# Patient Record
Sex: FEMALE | Race: WHITE | NOT HISPANIC OR LATINO | URBAN - NONMETROPOLITAN AREA
[De-identification: names, ages, dates, MRNs, and addresses within clinical notes are randomized per-mention and may not be internally consistent; named-entity substitution may affect disease eponyms.]

---

## 2017-01-06 ENCOUNTER — HOSPITAL ENCOUNTER (EMERGENCY)
Facility: HOSPITAL | Age: 62
Discharge: HOME | End: 2017-01-07
Attending: EMERGENCY MEDICINE | Admitting: EMERGENCY MEDICINE

## 2017-01-06 ENCOUNTER — APPOINTMENT (OUTPATIENT)
Dept: CT IMAGING | Facility: HOSPITAL | Age: 62
End: 2017-01-06

## 2017-01-06 ENCOUNTER — APPOINTMENT (OUTPATIENT)
Dept: GENERAL RADIOLOGY | Facility: HOSPITAL | Age: 62
End: 2017-01-06

## 2017-01-06 DIAGNOSIS — J01.00 ACUTE NON-RECURRENT MAXILLARY SINUSITIS: ICD-10-CM

## 2017-01-06 DIAGNOSIS — H83.03: Primary | ICD-10-CM

## 2017-01-06 PROCEDURE — 70450 CT HEAD/BRAIN W/O DYE: CPT

## 2017-01-06 PROCEDURE — 80307 DRUG TEST PRSMV CHEM ANLYZR: CPT | Performed by: NURSE PRACTITIONER

## 2017-01-06 PROCEDURE — G0477 DRUG TEST PRESUMP OPTICAL: HCPCS | Performed by: NURSE PRACTITIONER

## 2017-01-06 PROCEDURE — 87804 INFLUENZA ASSAY W/OPTIC: CPT | Performed by: NURSE PRACTITIONER

## 2017-01-06 PROCEDURE — 93005 ELECTROCARDIOGRAM TRACING: CPT | Performed by: NURSE PRACTITIONER

## 2017-01-06 PROCEDURE — 93010 ELECTROCARDIOGRAM REPORT: CPT | Performed by: INTERNAL MEDICINE

## 2017-01-06 PROCEDURE — 99284 EMERGENCY DEPT VISIT MOD MDM: CPT

## 2017-01-06 PROCEDURE — 84484 ASSAY OF TROPONIN QUANT: CPT | Performed by: NURSE PRACTITIONER

## 2017-01-06 PROCEDURE — 70450 CT HEAD/BRAIN W/O DYE: CPT | Performed by: RADIOLOGY

## 2017-01-06 PROCEDURE — 81001 URINALYSIS AUTO W/SCOPE: CPT | Performed by: NURSE PRACTITIONER

## 2017-01-06 PROCEDURE — 80048 BASIC METABOLIC PNL TOTAL CA: CPT | Performed by: NURSE PRACTITIONER

## 2017-01-06 PROCEDURE — 71010 XR CHEST 1 VW: CPT | Performed by: RADIOLOGY

## 2017-01-06 PROCEDURE — 71010 HC CHEST PA OR AP: CPT

## 2017-01-06 PROCEDURE — 85025 COMPLETE CBC W/AUTO DIFF WBC: CPT | Performed by: NURSE PRACTITIONER

## 2017-01-07 VITALS
WEIGHT: 150 LBS | BODY MASS INDEX: 25.61 KG/M2 | DIASTOLIC BLOOD PRESSURE: 79 MMHG | RESPIRATION RATE: 18 BRPM | HEIGHT: 64 IN | OXYGEN SATURATION: 100 % | TEMPERATURE: 98.8 F | HEART RATE: 70 BPM | SYSTOLIC BLOOD PRESSURE: 154 MMHG

## 2017-01-07 LAB
AMPHET+METHAMPHET UR QL: NEGATIVE
ANION GAP SERPL CALCULATED.3IONS-SCNC: 2.7 MMOL/L (ref 3.6–11.2)
BACTERIA UR QL AUTO: ABNORMAL /HPF
BARBITURATES UR QL SCN: NEGATIVE
BASOPHILS # BLD AUTO: 0.04 10*3/MM3 (ref 0–0.3)
BASOPHILS NFR BLD AUTO: 0.6 % (ref 0–2)
BENZODIAZ UR QL SCN: NEGATIVE
BILIRUB UR QL STRIP: NEGATIVE
BUN BLD-MCNC: 14 MG/DL (ref 7–21)
BUN/CREAT SERPL: 15.2 (ref 7–25)
BUPRENORPHINE+NOR UR QL SCN: NEGATIVE
CALCIUM SPEC-SCNC: 9.3 MG/DL (ref 7.7–10)
CANNABINOIDS SERPL QL: NEGATIVE
CHLORIDE SERPL-SCNC: 108 MMOL/L (ref 99–112)
CLARITY UR: CLEAR
CO2 SERPL-SCNC: 29.3 MMOL/L (ref 24.3–31.9)
COCAINE UR QL: NEGATIVE
COLOR UR: YELLOW
CREAT BLD-MCNC: 0.92 MG/DL (ref 0.43–1.29)
DEPRECATED RDW RBC AUTO: 49.6 FL (ref 37–54)
EOSINOPHIL # BLD AUTO: 0.14 10*3/MM3 (ref 0–0.7)
EOSINOPHIL NFR BLD AUTO: 2.1 % (ref 0–5)
ERYTHROCYTE [DISTWIDTH] IN BLOOD BY AUTOMATED COUNT: 12.9 % (ref 11.5–14.5)
ETHANOL BLD-MCNC: <10 MG/DL
ETHANOL UR QL: <0.01 %
FLUAV AG NPH QL: NEGATIVE
FLUBV AG NPH QL IA: NEGATIVE
GFR SERPL CREATININE-BSD FRML MDRD: 62 ML/MIN/1.73
GLUCOSE BLD-MCNC: 98 MG/DL (ref 70–110)
GLUCOSE UR STRIP-MCNC: NEGATIVE MG/DL
HCT VFR BLD AUTO: 41.9 % (ref 37–47)
HGB BLD-MCNC: 13.6 G/DL (ref 12–16)
HGB UR QL STRIP.AUTO: ABNORMAL
HYALINE CASTS UR QL AUTO: ABNORMAL /LPF
IMM GRANULOCYTES # BLD: 0.01 10*3/MM3 (ref 0–0.03)
IMM GRANULOCYTES NFR BLD: 0.2 % (ref 0–0.5)
KETONES UR QL STRIP: NEGATIVE
LEUKOCYTE ESTERASE UR QL STRIP.AUTO: NEGATIVE
LYMPHOCYTES # BLD AUTO: 2.1 10*3/MM3 (ref 1–3)
LYMPHOCYTES NFR BLD AUTO: 31.7 % (ref 21–51)
MCH RBC QN AUTO: 33.4 PG (ref 27–33)
MCHC RBC AUTO-ENTMCNC: 32.5 G/DL (ref 33–37)
MCV RBC AUTO: 102.9 FL (ref 80–94)
METHADONE UR QL SCN: NEGATIVE
MONOCYTES # BLD AUTO: 0.51 10*3/MM3 (ref 0.1–0.9)
MONOCYTES NFR BLD AUTO: 7.7 % (ref 0–10)
NEUTROPHILS # BLD AUTO: 3.82 10*3/MM3 (ref 1.4–6.5)
NEUTROPHILS NFR BLD AUTO: 57.7 % (ref 30–70)
NITRITE UR QL STRIP: NEGATIVE
OPIATES UR QL: NEGATIVE
OSMOLALITY SERPL CALC.SUM OF ELEC: 279.8 MOSM/KG (ref 273–305)
OXYCODONE UR QL SCN: NEGATIVE
PCP UR QL SCN: NEGATIVE
PH UR STRIP.AUTO: 5.5 [PH] (ref 5–8)
PLATELET # BLD AUTO: 226 10*3/MM3 (ref 130–400)
PMV BLD AUTO: 10 FL (ref 6–10)
POTASSIUM BLD-SCNC: 3.6 MMOL/L (ref 3.5–5.3)
PROPOXYPH UR QL: NEGATIVE
PROT UR QL STRIP: NEGATIVE
RBC # BLD AUTO: 4.07 10*6/MM3 (ref 4.2–5.4)
RBC # UR: ABNORMAL /HPF
REF LAB TEST METHOD: ABNORMAL
SODIUM BLD-SCNC: 140 MMOL/L (ref 135–153)
SP GR UR STRIP: 1.01 (ref 1–1.03)
SQUAMOUS #/AREA URNS HPF: ABNORMAL /HPF
TROPONIN I SERPL-MCNC: <0.006 NG/ML
UROBILINOGEN UR QL STRIP: ABNORMAL
WBC NRBC COR # BLD: 6.62 10*3/MM3 (ref 4.5–12.5)
WBC UR QL AUTO: ABNORMAL /HPF

## 2017-01-07 RX ORDER — AMOXICILLIN AND CLAVULANATE POTASSIUM 875; 125 MG/1; MG/1
1 TABLET, FILM COATED ORAL ONCE
Status: COMPLETED | OUTPATIENT
Start: 2017-01-07 | End: 2017-01-07

## 2017-01-07 RX ORDER — MECLIZINE HYDROCHLORIDE 50 MG/1
50 TABLET ORAL 3 TIMES DAILY PRN
Qty: 15 TABLET | Refills: 0 | Status: SHIPPED | OUTPATIENT
Start: 2017-01-07 | End: 2017-07-08

## 2017-01-07 RX ORDER — AMOXICILLIN AND CLAVULANATE POTASSIUM 875; 125 MG/1; MG/1
1 TABLET, FILM COATED ORAL 2 TIMES DAILY
Qty: 20 TABLET | Refills: 0 | Status: SHIPPED | OUTPATIENT
Start: 2017-01-07 | End: 2017-07-08

## 2017-01-07 RX ORDER — MECLIZINE HCL 12.5 MG/1
25 TABLET ORAL ONCE
Status: COMPLETED | OUTPATIENT
Start: 2017-01-07 | End: 2017-01-07

## 2017-01-07 RX ADMIN — AMOXICILLIN AND CLAVULANATE POTASSIUM 1 TABLET: 875; 125 TABLET, FILM COATED ORAL at 01:21

## 2017-01-07 RX ADMIN — MECLIZINE HCL 25 MG: 12.5 TABLET ORAL at 00:24

## 2017-01-07 NOTE — ED PROVIDER NOTES
Subjective   Patient is a 61 y.o. female presenting with syncope.   History provided by:  Patient   used: No    Syncope   Episode history:  Multiple  Most recent episode:  Today  Timing:  Intermittent  Progression:  Waxing and waning  Chronicity:  New  Context: standing up    Context: not blood draw, not bowel movement and not dehydration    Witnessed: yes    Relieved by:  Nothing  Worsened by:  Nothing  Ineffective treatments:  None tried  Associated symptoms: dizziness, headaches and visual change    Associated symptoms: no anxiety, no chest pain, no confusion, no diaphoresis, no difficulty breathing, no focal weakness, no recent surgery, no rectal bleeding and no shortness of breath        Review of Systems   Constitutional: Negative.  Negative for diaphoresis.   HENT: Positive for sinus pressure.    Eyes: Negative.    Respiratory: Positive for cough. Negative for shortness of breath.    Cardiovascular: Positive for syncope. Negative for chest pain.   Gastrointestinal: Negative.    Endocrine: Negative.    Genitourinary: Negative.    Musculoskeletal: Negative.    Skin: Negative.    Neurological: Positive for dizziness and headaches. Negative for focal weakness.   Hematological: Negative.    Psychiatric/Behavioral: Negative.  Negative for confusion.   All other systems reviewed and are negative.      Past Medical History   Diagnosis Date   • Allergic    • Arthritis    • Bronchitis    • Disease of thyroid gland        Allergies   Allergen Reactions   • Doxycycline GI Intolerance       Past Surgical History   Procedure Laterality Date   • Eye surgery     • Appendectomy     • Tubal abdominal ligation         Family History   Problem Relation Age of Onset   • Stroke Mother    • Heart disease Father        Social History     Social History   • Marital status:      Spouse name: N/A   • Number of children: N/A   • Years of education: N/A     Social History Main Topics   • Smoking status: Current  Every Day Smoker     Types: Cigarettes   • Smokeless tobacco: Never Used   • Alcohol use No   • Drug use: No   • Sexual activity: Not Asked     Other Topics Concern   • None     Social History Narrative           Objective   Physical Exam   Constitutional: She is oriented to person, place, and time. She appears well-developed and well-nourished.   HENT:   Head: Normocephalic and atraumatic.   Nose: Nose normal.   Mouth/Throat: Oropharynx is clear and moist.   Right serous TM fluid, left serous TM fluid   Eyes: Conjunctivae are normal. Pupils are equal, round, and reactive to light.   Bilateral lateral nystagmus   Neck: Normal range of motion.   Cardiovascular: Normal rate, regular rhythm, normal heart sounds and intact distal pulses.    Pulmonary/Chest: Effort normal and breath sounds normal.   Abdominal: Soft. Bowel sounds are normal.   Musculoskeletal: Normal range of motion.   Neurological: She is alert and oriented to person, place, and time.   Skin: Skin is warm and dry.   Psychiatric: She has a normal mood and affect. Her behavior is normal. Judgment and thought content normal.   Nursing note and vitals reviewed.      Procedures         ED Course  ED Course   Value Comment By Time   CT Head Without Contrast Impression: no acute intracranial abnormality seen, there may be acute on chronic left maxillary sinusitis  [facility] report Haydee Cheung, APRN 01/07 0015   ECG 12 Lead Sinus bradycardia, nonspecific ST-T wave abnormality, no apparent acute ischemia. Reviewed by Dr. Martell at 2341.  Haydee Cheung, APRN 01/07 0016    Spoke with patient, says she feels somewhat better but still dizzy when she stands. Haydee Cheung, APRN 01/07 0028   XR Chest 1 View No apparent acute cardiopulmonary disease. Reviewed by Dr. Ilana Cheung, APRN 01/07 0045                  MDM  Number of Diagnoses or Management Options  Acute non-recurrent maxillary sinusitis: new and requires  workup  Labyrinthitis, acute viral, bilateral: new and requires workup     Amount and/or Complexity of Data Reviewed  Clinical lab tests: reviewed and ordered  Tests in the radiology section of CPT®: reviewed and ordered  Tests in the medicine section of CPT®: reviewed and ordered    Risk of Complications, Morbidity, and/or Mortality  Presenting problems: moderate  Diagnostic procedures: moderate  Management options: moderate        Final diagnoses:   Labyrinthitis, acute viral, bilateral   Acute non-recurrent maxillary sinusitis            Haydee Cheung, APRN  01/07/17 0127

## 2017-07-08 ENCOUNTER — OFFICE VISIT (OUTPATIENT)
Dept: RETAIL CLINIC | Facility: CLINIC | Age: 62
End: 2017-07-08

## 2017-07-08 VITALS
BODY MASS INDEX: 24.66 KG/M2 | OXYGEN SATURATION: 96 % | TEMPERATURE: 98 F | WEIGHT: 148 LBS | RESPIRATION RATE: 16 BRPM | HEART RATE: 74 BPM | HEIGHT: 65 IN

## 2017-07-08 DIAGNOSIS — F17.200 CURRENT SMOKER: ICD-10-CM

## 2017-07-08 DIAGNOSIS — J02.9 SORE THROAT: Primary | ICD-10-CM

## 2017-07-08 DIAGNOSIS — B00.1 COLD SORE: ICD-10-CM

## 2017-07-08 LAB
EXPIRATION DATE: NORMAL
INTERNAL CONTROL: NORMAL
Lab: NORMAL
S PYO AG THROAT QL: NEGATIVE

## 2017-07-08 PROCEDURE — 99213 OFFICE O/P EST LOW 20 MIN: CPT | Performed by: NURSE PRACTITIONER

## 2017-07-08 PROCEDURE — 87880 STREP A ASSAY W/OPTIC: CPT | Performed by: NURSE PRACTITIONER

## 2017-07-08 RX ORDER — ALBUTEROL SULFATE 90 UG/1
2 AEROSOL, METERED RESPIRATORY (INHALATION) EVERY 4 HOURS PRN
COMMUNITY

## 2017-07-08 RX ORDER — VALACYCLOVIR HYDROCHLORIDE 1 G/1
2000 TABLET, FILM COATED ORAL 2 TIMES DAILY
Qty: 8 TABLET | Refills: 0 | Status: SHIPPED | OUTPATIENT
Start: 2017-07-08 | End: 2017-07-10

## 2017-07-08 RX ORDER — DIPHENHYDRAMINE, LIDOCAINE, NYSTATIN
5 KIT ORAL 4 TIMES DAILY
Qty: 280 ML | Refills: 0 | Status: SHIPPED | OUTPATIENT
Start: 2017-07-08 | End: 2017-07-22

## 2017-07-08 RX ORDER — MONTELUKAST SODIUM 10 MG/1
10 TABLET ORAL NIGHTLY
COMMUNITY

## 2017-07-08 NOTE — PATIENT INSTRUCTIONS
"You Can Quit Smoking  If you are ready to quit smoking or are thinking about it, congratulations! You have chosen to help yourself be healthier and live longer! There are lots of different ways to quit smoking. Nicotine gum, nicotine patches, a nicotine inhaler, or nicotine nasal spray can help with physical craving. Hypnosis, support groups, and medicines help break the habit of smoking.  TIPS TO GET OFF AND STAY OFF CIGARETTES  Learn to predict your moods. Do not let a bad situation be your excuse to have a cigarette. Some situations in your life might tempt you to have a cigarette.  Ask friends and co-workers not to smoke around you.  Make your home smoke-free.  Never have \"just one\" cigarette. It leads to wanting another and another. Remind yourself of your decision to quit.  On a card, make a list of your reasons for not smoking. Read it at least the same number of times a day as you have a cigarette. Tell yourself everyday, \"I do not want to smoke. I choose not to smoke.\"  Ask someone at home or work to help you with your plan to quit smoking.  Have something planned after you eat or have a cup of coffee. Take a walk or get other exercise to perk you up. This will help to keep you from overeating.  Try a relaxation exercise to calm you down and decrease your stress. Remember, you may be tense and nervous the first two weeks after you quit. This will pass.  Find new activities to keep your hands busy. Play with a pen, coin, or rubber band. Doodle or draw things on paper.  Brush your teeth right after eating. This will help cut down the craving for the taste of tobacco after meals. You can try mouthwash too.  Try gum, breath mints, or diet candy to keep something in your mouth.  IF YOU SMOKE AND WANT TO QUIT:  Do not stock up on cigarettes. Never buy a carton. Wait until one pack is finished before you buy another.  Never carry cigarettes with you at work or at home.  Keep cigarettes as far away from you as " "possible. Leave them with someone else.  Never carry matches or a lighter with you.  Ask yourself, \"Do I need this cigarette or is this just a reflex?\"  Bet with someone that you can quit. Put cigarette money in a [employer] bank every morning. If you smoke, you give up the money. If you do not smoke, by the end of the week, you keep the money.  Keep trying. It takes 21 days to change a habit!  Talk to your doctor about using medicines to help you quit. These include nicotine replacement gum, lozenges, or skin patches.     This information is not intended to replace advice given to you by your health care provider. Make sure you discuss any questions you have with your health care provider.     Document Released: 10/14/2010 Document Revised: 03/11/2013 Document Reviewed: 05/03/2016  [employer] Interactive Patient Education ©2017 [employer]  Sore Throat  When you have a sore throat, your throat may:  Hurt.  Burn.  Feel irritated.  Feel scratchy.  Many things can cause a sore throat, including:  An infection.  Allergies.  Dryness in the air.  Smoke or pollution.  Gastroesophageal reflux disease (GERD).  A tumor.  A sore throat can be the first sign of another sickness. It can happen with other problems, like coughing or a fever. Most sore throats go away without treatment.  HOME CARE  Take over-the-counter medicines only as told by your doctor.  Drink enough fluids to keep your pee (urine) clear or pale yellow.  Rest when you feel you need to.  To help with pain, try:  Sipping warm liquids, such as broth, herbal tea, or warm water.  Eating or drinking cold or frozen liquids, such as frozen ice pops.  Gargling with a salt-water mixture 3-4 times a day or as needed. To make a salt-water mixture, add ½-1 tsp of salt in 1 cup of warm water. Mix it until you cannot see the salt anymore.  Sucking on hard candy or throat lozenges.  Putting a cool-mist humidifier in your bedroom at night.  Sitting in the bathroom with the door " closed for 5-10 minutes while you run hot water in the shower.  Do not use any tobacco products, such as cigarettes, chewing tobacco, and e-cigarettes. If you need help quitting, ask your doctor.  GET HELP IF:  You have a fever for more than 2-3 days.  You keep having symptoms for more than 2-3 days.  Your throat does not get better in 7 days.  You have a fever and your symptoms suddenly get worse.  GET HELP RIGHT AWAY IF:   You have trouble breathing.  You cannot swallow fluids, soft foods, or your saliva.  You have swelling in your throat or neck that gets worse.  You keep feeling like you are going to throw up (vomit).  You keep throwing up.     This information is not intended to replace advice given to you by your health care provider. Make sure you discuss any questions you have with your health care provider.     Document Released: 09/26/2009 Document Revised: 04/10/2017 Document Reviewed: 10/07/2016  [employer] Interactive Patient Education ©2017 [employer]  Oral Ulcers  Oral ulcers are sores inside the mouth or near the mouth. They may be called canker sores or cold sores, which are two types of oral ulcers. Many oral ulcers are harmless and go away on their own. In some cases, oral ulcers may require medical care to determine the cause and proper treatment.  CAUSES  Common causes of this condition include:  · Viral, bacterial, or fungal infection.  · Emotional stress.  · Foods or chemicals that irritate the mouth.  · Injury or physical irritation of the mouth.  · Medicines.  · Allergies.  · Tobacco use.  Less common causes include:   · Skin disease.  · A type of herpes virus infection (herpes simplex or herpes zoster).  · Oral cancer.  In some cases, the cause of this condition may not be known.  RISK FACTORS  Oral ulcers are more likely to develop in:  · People who wear dental braces, dentures, or retainers.  · People who do not keep their mouth clean or brush their teeth regularly.  · People who have  sensitive skin.  · People who have conditions that affect the entire body (systemic conditions), such as immune disorders.  SYMPTOMS  The main symptom of this condition is one or more oval-shaped or round ulcers that have red borders. Details about symptoms may vary depending on the cause.  · Location of the ulcers. They may be inside the mouth, on the gums, or on the insides of the lips or cheeks. They may also be on the lips or on skin that is near the mouth, such as the cheeks and chin.  · Pain. Ulcers can be painful and uncomfortable, or they can be painless.  · Appearance of the ulcers. They may look like red blisters and be filled with fluid, or they may be white or yellow patches.  · Frequency of outbreaks. Ulcers may go away permanently after one outbreak, or they may come back (recur) often or rarely.  DIAGNOSIS  This condition is diagnosed with a physical exam. Your health care provider may ask you questions about your lifestyle and your medical history. You may have tests, including:  · Blood tests.  · Removal of a small number of cells from an ulcer to be examined under a microscope (biopsy).  TREATMENT  This condition is treated by managing any pain and discomfort, and by treating the underlying cause of the ulcers, if necessary. Usually, oral ulcers resolve by themselves in 1-2 weeks. You may be told to keep your mouth clean and avoid things that cause or irritate your ulcers. Your health care provider may prescribe medicines to reduce pain and discomfort or treat the underlying cause, if this applies.  HOME CARE INSTRUCTIONS  Lifestyle   · Follow instructions from your health care provider about eating or drinking restrictions.    Drink enough fluid to keep your urine clear or pale yellow.    Avoid foods and drinks that irritate your ulcers.  · Avoid tobacco products, including cigarettes, chewing tobacco, or e-cigarettes. If you need help quitting, ask your health care provider.  · Avoid excessive  alcohol use.  Oral Hygiene   · Avoid physical or chemical irritants that may have caused the ulcers or made them worse, such as mouthwashes that contain alcohol (ethanol). If you wear dental braces, dentures, or retainers, work with your health care provider to make sure these devices are fitted correctly.  · Brush and floss your teeth at least once every day, and get regular dental cleanings and checkups.  · Gargle with a salt-water mixture 3-4 times per day or as told by your health care provider. To make a salt-water mixture, completely dissolve ½-1 tsp of salt in 1 cup of warm water.  General Instructions   · Take over-the-counter and prescription medicines only as told by your health care provider.  · If you have pain, wrap a cold compress in a towel and gently press it against your face to help reduce pain.  · Keep all follow-up visits as told by your health care provider. This is important.  SEEK MEDICAL CARE IF:  · You have pain that gets worse or does not get better with medicine.  · You have 4 or more ulcers at one time.  · You have a fever.  · You have new ulcers that look or feel different from other ulcers you have.  · You have inflammation in one eye or both eyes.  · You have ulcers that do not go away after 10 days.  · You develop new symptoms in your mouth, such as:    Bleeding or crusting around your lips or gums.    Tooth pain.    Difficulty swallowing.  · You develop symptoms on your skin or genitals, such as:    A rash or blisters.    Burning or itching sensations.  · Your ulcers begin or get worse after you start a new medicine.  SEEK IMMEDIATE MEDICAL CARE IF:  · You have difficulty breathing.  · You have swelling in your face or neck.  · You have excessive bleeding from your mouth.  · You have severe pain.     This information is not intended to replace advice given to you by your health care provider. Make sure you discuss any questions you have with your health care provider.     Document  Released: 01/25/2006 Document Revised: 04/10/2017 Document Reviewed: 05/04/2016  [employer] Interactive Patient Education ©2017 [employer]

## 2017-07-08 NOTE — PROGRESS NOTES
"  HPI Comments: Rebecca presents to the clinic today c/o a sore throat which started appx 5-7 days ago. Associated symptoms include several cold sores, nasal congestion, cough and tenderness/irritation to her tongue. She has tried avoidance of her inhalers and gargles without adequate relief. Refer to ROS for additional information.    Sore Throat    This is a new problem. The current episode started in the past 7 days. The problem has been waxing and waning. There has been no fever. Associated symptoms include congestion and coughing. Pertinent negatives include no abdominal pain, drooling, ear discharge, ear pain, headaches, plugged ear sensation, shortness of breath, swollen glands, trouble swallowing or vomiting. Exposure to: No known exposure. She has tried gargles for the symptoms. The treatment provided no relief.       Vitals:    07/08/17 1211   Pulse: 74   Resp: 16   Temp: 98 °F (36.7 °C)   TempSrc: Oral   SpO2: 96%   Weight: 148 lb (67.1 kg)   Height: 64.5\" (163.8 cm)      The following portions of the patient's history were reviewed and updated as appropriate: allergies, current medications, past family history, past medical history, past social history, past surgical history and problem list.    Review of Systems   Constitutional: Negative for activity change, appetite change, chills, fatigue and fever.   HENT: Positive for congestion, mouth sores and sore throat. Negative for drooling, ear discharge, ear pain, sinus pressure and trouble swallowing.    Eyes: Negative for discharge and redness.   Respiratory: Positive for cough. Negative for shortness of breath and wheezing (Baseline).    Cardiovascular: Negative for chest pain and leg swelling.   Gastrointestinal: Negative for abdominal pain, nausea and vomiting.   Musculoskeletal: Negative for neck stiffness.   Skin: Negative for color change and rash.   Neurological: Negative for headaches.   Hematological: Negative for adenopathy.     Physical Exam "   Constitutional: She is oriented to person, place, and time. She appears well-developed and well-nourished. No distress.   HENT:   Head: Normocephalic.   Right Ear: Ear canal normal. No tenderness. No mastoid tenderness. Tympanic membrane is not erythematous. A middle ear effusion is present.   Left Ear: Ear canal normal. No tenderness. No mastoid tenderness. Tympanic membrane is not erythematous and not bulging. A middle ear effusion is present.   Nose: Nose normal. No mucosal edema, rhinorrhea or sinus tenderness. Right sinus exhibits no maxillary sinus tenderness and no frontal sinus tenderness. Left sinus exhibits no maxillary sinus tenderness and no frontal sinus tenderness.   Mouth/Throat: No oral lesions. No uvula swelling. Posterior oropharyngeal erythema present. No oropharyngeal exudate.   Cold sores on both sides of lips/ Erythematous areas on tongue    Eyes: Conjunctivae are normal. Pupils are equal, round, and reactive to light. Right eye exhibits no discharge. Left eye exhibits no discharge. No scleral icterus.   Neck: Neck supple. No tracheal tenderness present.   Cardiovascular: Normal rate, regular rhythm and normal heart sounds.  Exam reveals no friction rub.    No murmur heard.  Pulmonary/Chest: Effort normal and breath sounds normal. No respiratory distress. She has no wheezes. She has no rales.   Musculoskeletal: She exhibits no edema or tenderness.   Lymphadenopathy:        Head (right side): No tonsillar and no preauricular adenopathy present.        Head (left side): No tonsillar and no preauricular adenopathy present.     She has no cervical adenopathy.   Neurological: She is alert and oriented to person, place, and time.   Skin: Skin is warm and dry. No rash noted. No erythema.   Psychiatric: She has a normal mood and affect. Her behavior is normal. Judgment and thought content normal.   Nursing note and vitals reviewed.    Assessment/Plan   Problems Addressed this Visit        Respiratory     Sore throat - Primary    Relevant Medications    nystatin susp + lidocaine viscous (MAGIC MOUTHWASH) oral suspension    Other Relevant Orders    POC Rapid Strep A (Completed)      Other Visit Diagnoses     Cold sore        Supportive care measures reviewed.     Relevant Medications    valACYclovir (VALTREX) 1000 MG tablet    Current smoker        Encouraged smoking cessation. She does reports she has cut down to 1/2 ppd vs 2 ppd previously.          Findings and recommendations reviewed with Qamarjoselin. Reviewed results of her strep test which was negative. Supportive care measures reviewed. Reinforced need to rinse mouth after Breo administration.  Encouraged Qamarjoselin to seek further medical evaluation if symptoms worsen or do not improve within 48-72 hours.  Lab Results   Component Value Date    RAPSCRN Negative 07/08/2017

## 2021-03-05 ENCOUNTER — APPOINTMENT (OUTPATIENT)
Dept: VACCINE CLINIC | Facility: HOSPITAL | Age: 66
End: 2021-03-05

## 2021-09-20 ENCOUNTER — HOSPITAL ENCOUNTER (OUTPATIENT)
Dept: HOSPITAL 79 - EXRD | Age: 66
End: 2021-09-20
Attending: FAMILY MEDICINE
Payer: MEDICARE

## 2021-09-20 DIAGNOSIS — R07.81: Primary | ICD-10-CM

## 2021-12-06 ENCOUNTER — HOSPITAL ENCOUNTER (OUTPATIENT)
Dept: HOSPITAL 79 - KOH-I | Age: 66
End: 2021-12-06
Attending: FAMILY MEDICINE
Payer: MEDICARE

## 2021-12-06 DIAGNOSIS — R91.8: ICD-10-CM

## 2021-12-06 DIAGNOSIS — Z87.891: Primary | ICD-10-CM

## 2022-01-21 ENCOUNTER — HOSPITAL ENCOUNTER (OUTPATIENT)
Dept: HOSPITAL 79 - LAB | Age: 67
End: 2022-01-21
Attending: FAMILY MEDICINE
Payer: MEDICARE

## 2022-01-21 DIAGNOSIS — E53.8: ICD-10-CM

## 2022-01-21 DIAGNOSIS — E55.9: Primary | ICD-10-CM

## 2022-01-21 DIAGNOSIS — E03.9: ICD-10-CM

## 2022-01-22 LAB
A/G RATIO: 1.7 (ref 1.2–2.2)
ALBUMIN, SERUM: 4.8 G/DL (ref 3.8–4.8)
ALKALINE PHOSPHATASE, S: 52 IU/L (ref 44–121)
ALT (SGPT): 24 IU/L (ref 0–32)
AST (SGOT): 28 IU/L (ref 0–40)
BUN/CREATININE RATIO: 16 (ref 12–28)
BUN: 16 MG/DL (ref 8–27)
CALCIUM, SERUM: 9.9 MG/DL (ref 8.7–10.3)
CARBON DIOXIDE, TOTAL: 27 MMOL/L (ref 20–29)
CHLORIDE, SERUM: 101 MMOL/L (ref 96–106)
EGFR IF AFRICN AM: 65 (ref 59–?)
EGFR IF NONAFRICN AM: 56 (ref 59–?)
GLOBULIN, TOTAL: 2.9 G/DL (ref 1.5–4.5)
GLUCOSE, SERUM: 94 MG/DL (ref 65–99)
HDL CHOLESTEROL: 57 MG/DL (ref 39–?)
LDL CHOLESTEROL CALC: 127 MG/DL (ref 0–99)
LDL/HDL RATIO: 2.2 RATIO (ref 0–3.2)
POTASSIUM, SERUM: 4.2 MMOL/L (ref 3.5–5.2)
PROTEIN, TOTAL, SERUM: 7.7 G/DL (ref 6–8.5)
SODIUM, SERUM: 142 MMOL/L (ref 134–144)
T. CHOL/HDL RATIO: 3.7 RATIO (ref 0–4.4)
TRIGLYCERIDES: 139 MG/DL (ref 0–149)
TSH: 5.54 UIU/ML (ref 0.45–4.5)
VITAMIN D, 25-HYDROXY: 23.4 NG/ML (ref 30–100)

## 2022-02-02 ENCOUNTER — HOSPITAL ENCOUNTER (OUTPATIENT)
Dept: HOSPITAL 79 - MAMO | Age: 67
End: 2022-02-02
Attending: FAMILY MEDICINE
Payer: MEDICARE

## 2022-02-02 DIAGNOSIS — M81.0: ICD-10-CM

## 2022-02-02 DIAGNOSIS — Z12.31: Primary | ICD-10-CM

## 2022-02-02 DIAGNOSIS — Z78.0: ICD-10-CM

## 2022-07-01 ENCOUNTER — HOSPITAL ENCOUNTER (OUTPATIENT)
Dept: HOSPITAL 79 - EROP | Age: 67
End: 2022-07-01
Attending: FAMILY MEDICINE
Payer: MEDICARE

## 2022-07-01 VITALS — WEIGHT: 222 LBS | BODY MASS INDEX: 33.65 KG/M2 | HEIGHT: 68 IN

## 2022-07-01 DIAGNOSIS — I10: ICD-10-CM

## 2022-07-01 DIAGNOSIS — J98.4: ICD-10-CM

## 2022-07-01 DIAGNOSIS — U07.1: Primary | ICD-10-CM

## 2022-07-01 DIAGNOSIS — Z23: ICD-10-CM

## 2022-07-01 PROCEDURE — M0222: HCPCS
